# Patient Record
Sex: FEMALE | Race: BLACK OR AFRICAN AMERICAN | NOT HISPANIC OR LATINO | URBAN - METROPOLITAN AREA
[De-identification: names, ages, dates, MRNs, and addresses within clinical notes are randomized per-mention and may not be internally consistent; named-entity substitution may affect disease eponyms.]

---

## 2020-01-28 ENCOUNTER — EMERGENCY (EMERGENCY)
Facility: HOSPITAL | Age: 60
LOS: 1 days | Discharge: HOME | End: 2020-01-28
Attending: EMERGENCY MEDICINE | Admitting: EMERGENCY MEDICINE
Payer: COMMERCIAL

## 2020-01-28 VITALS
DIASTOLIC BLOOD PRESSURE: 86 MMHG | TEMPERATURE: 98 F | HEART RATE: 80 BPM | SYSTOLIC BLOOD PRESSURE: 122 MMHG | RESPIRATION RATE: 19 BRPM | OXYGEN SATURATION: 97 %

## 2020-01-28 VITALS
HEIGHT: 62 IN | SYSTOLIC BLOOD PRESSURE: 124 MMHG | RESPIRATION RATE: 17 BRPM | HEART RATE: 97 BPM | DIASTOLIC BLOOD PRESSURE: 82 MMHG | OXYGEN SATURATION: 98 % | WEIGHT: 218.04 LBS | TEMPERATURE: 98 F

## 2020-01-28 LAB
ALBUMIN SERPL ELPH-MCNC: 4 G/DL — SIGNIFICANT CHANGE UP (ref 3.3–5)
ALP SERPL-CCNC: 99 U/L — SIGNIFICANT CHANGE UP (ref 40–120)
ALT FLD-CCNC: 16 U/L — SIGNIFICANT CHANGE UP (ref 10–45)
ANION GAP SERPL CALC-SCNC: 15 MMOL/L — SIGNIFICANT CHANGE UP (ref 5–17)
APTT BLD: 31.6 SEC — SIGNIFICANT CHANGE UP (ref 27.5–36.3)
AST SERPL-CCNC: 16 U/L — SIGNIFICANT CHANGE UP (ref 10–40)
BILIRUB SERPL-MCNC: 0.2 MG/DL — SIGNIFICANT CHANGE UP (ref 0.2–1.2)
BUN SERPL-MCNC: 11 MG/DL — SIGNIFICANT CHANGE UP (ref 7–23)
CALCIUM SERPL-MCNC: 8.7 MG/DL — SIGNIFICANT CHANGE UP (ref 8.4–10.5)
CHLORIDE SERPL-SCNC: 103 MMOL/L — SIGNIFICANT CHANGE UP (ref 96–108)
CO2 SERPL-SCNC: 25 MMOL/L — SIGNIFICANT CHANGE UP (ref 22–31)
CREAT SERPL-MCNC: 0.67 MG/DL — SIGNIFICANT CHANGE UP (ref 0.5–1.3)
GLUCOSE SERPL-MCNC: 336 MG/DL — HIGH (ref 70–99)
HCT VFR BLD CALC: 38.4 % — SIGNIFICANT CHANGE UP (ref 34.5–45)
HGB BLD-MCNC: 12.8 G/DL — SIGNIFICANT CHANGE UP (ref 11.5–15.5)
INR BLD: 0.97 — SIGNIFICANT CHANGE UP (ref 0.88–1.16)
MCHC RBC-ENTMCNC: 28.7 PG — SIGNIFICANT CHANGE UP (ref 27–34)
MCHC RBC-ENTMCNC: 33.3 GM/DL — SIGNIFICANT CHANGE UP (ref 32–36)
MCV RBC AUTO: 86.1 FL — SIGNIFICANT CHANGE UP (ref 80–100)
NRBC # BLD: 0 /100 WBCS — SIGNIFICANT CHANGE UP (ref 0–0)
PLATELET # BLD AUTO: 210 K/UL — SIGNIFICANT CHANGE UP (ref 150–400)
POTASSIUM SERPL-MCNC: 4.1 MMOL/L — SIGNIFICANT CHANGE UP (ref 3.5–5.3)
POTASSIUM SERPL-SCNC: 4.1 MMOL/L — SIGNIFICANT CHANGE UP (ref 3.5–5.3)
PROT SERPL-MCNC: 6.8 G/DL — SIGNIFICANT CHANGE UP (ref 6–8.3)
PROTHROM AB SERPL-ACNC: 11.1 SEC — SIGNIFICANT CHANGE UP (ref 10–12.9)
RBC # BLD: 4.46 M/UL — SIGNIFICANT CHANGE UP (ref 3.8–5.2)
RBC # FLD: 13.3 % — SIGNIFICANT CHANGE UP (ref 10.3–14.5)
SODIUM SERPL-SCNC: 143 MMOL/L — SIGNIFICANT CHANGE UP (ref 135–145)
TROPONIN T SERPL-MCNC: <0.01 NG/ML — SIGNIFICANT CHANGE UP (ref 0–0.01)
TROPONIN T SERPL-MCNC: <0.01 NG/ML — SIGNIFICANT CHANGE UP (ref 0–0.01)
WBC # BLD: 10.67 K/UL — HIGH (ref 3.8–10.5)
WBC # FLD AUTO: 10.67 K/UL — HIGH (ref 3.8–10.5)

## 2020-01-28 PROCEDURE — 36415 COLL VENOUS BLD VENIPUNCTURE: CPT

## 2020-01-28 PROCEDURE — 99285 EMERGENCY DEPT VISIT HI MDM: CPT | Mod: 25

## 2020-01-28 PROCEDURE — 71045 X-RAY EXAM CHEST 1 VIEW: CPT

## 2020-01-28 PROCEDURE — 85610 PROTHROMBIN TIME: CPT

## 2020-01-28 PROCEDURE — 99284 EMERGENCY DEPT VISIT MOD MDM: CPT | Mod: 25

## 2020-01-28 PROCEDURE — 84484 ASSAY OF TROPONIN QUANT: CPT

## 2020-01-28 PROCEDURE — 71045 X-RAY EXAM CHEST 1 VIEW: CPT | Mod: 26

## 2020-01-28 PROCEDURE — 85379 FIBRIN DEGRADATION QUANT: CPT

## 2020-01-28 PROCEDURE — 85730 THROMBOPLASTIN TIME PARTIAL: CPT

## 2020-01-28 PROCEDURE — 80053 COMPREHEN METABOLIC PANEL: CPT

## 2020-01-28 PROCEDURE — 85027 COMPLETE CBC AUTOMATED: CPT

## 2020-01-28 PROCEDURE — 93005 ELECTROCARDIOGRAM TRACING: CPT

## 2020-01-28 PROCEDURE — 93010 ELECTROCARDIOGRAM REPORT: CPT

## 2020-01-28 NOTE — ED PROVIDER NOTE - NSFOLLOWUPINSTRUCTIONS_ED_ALL_ED_FT
Nonspecific Chest Pain, Adult  Chest pain can be caused by many different conditions. It can be caused by a condition that is life-threatening and requires treatment right away. It can also be caused by something that is not life-threatening. If you have chest pain, it can be hard to know the difference, so it is important to get help right away to make sure that you do not have a serious condition.  Some life-threatening causes of chest pain include:  Heart attack.A tear in the body's main blood vessel (aortic dissection).Inflammation around your heart (pericarditis).A problem in the lungs, such as a blood clot (pulmonary embolism) or a collapsed lung (pneumothorax).Some non life-threatening causes of chest pain include:  Heartburn.Anxiety or stress.Damage to the bones, muscles, and cartilage that make up your chest wall.Pneumonia or bronchitis.Shingles infection (varicella-zoster virus).Chest pain can feel like:  Pain or discomfort on the surface of your chest or deep in your chest.Crushing, pressure, aching, or squeezing pain.Burning or tingling.Dull or sharp pain that is worse when you move, cough, or take a deep breath.Pain or discomfort that is also felt in your back, neck, jaw, shoulder, or arm, or pain that spreads to any of these areas.Your chest pain may come and go. It may also be constant. Your health care provider will do lab tests and other studies to find the cause of your pain. Treatment will depend on the cause of your chest pain.  Follow these instructions at home:  Medicines     Take over-the-counter and prescription medicines only as told by your health care provider.If you were prescribed an antibiotic, take it as told by your health care provider. Do not stop taking the antibiotic even if you start to feel better.Lifestyle        Rest as directed by your health care provider.Do not use any products that contain nicotine or tobacco, such as cigarettes and e-cigarettes. If you need help quitting, ask your health care provider.Do not drink alcohol.Make healthy lifestyle choices as recommended. These may include:  Getting regular exercise. Ask your health care provider to suggest some activities that are safe for you.Eating a heart-healthy diet. This includes plenty of fresh fruits and vegetables, whole grains, low-fat (lean) protein, and low-fat dairy products. A dietitian can help you find healthy eating options.Maintaining a healthy weight.Managing any other health conditions you have, such as high blood pressure (hypertension) or diabetes.Reducing stress, such as with yoga or relaxation techniques.General instructions     Pay attention to any changes in your symptoms. Tell your health care provider about them or any new symptoms.Avoid any activities that cause chest pain.Keep all follow-up visits as told by your health care provider. This is important. This includes visits for any further testing if your chest pain does not go away.Contact a health care provider if:  Your chest pain does not go away.You feel depressed.You have a fever.Get help right away if:  Your chest pain gets worse.You have a cough that gets worse, or you cough up blood.You have severe pain in your abdomen.You faint.You have sudden, unexplained chest discomfort.You have sudden, unexplained discomfort in your arms, back, neck, or jaw.You have shortness of breath at any time.You suddenly start to sweat, or your skin gets clammy.You feel nausea or you vomit.You suddenly feel lightheaded or dizzy.You have severe weakness, or unexplained weakness or fatigue.Your heart begins to beat quickly, or it feels like it is skipping beats.These symptoms may represent a serious problem that is an emergency. Do not wait to see if the symptoms will go away. Get medical help right away. Call your local emergency services (911 in the [place] Do not drive yourself to the hospital.   Summary  Chest pain can be caused by a condition that is serious and requires urgent treatment. It may also be caused by something that is not life-threatening.If you have chest pain, it is very important to see your health care provider. Your health care provider may do lab tests and other studies to find the cause of your pain.Follow your health care provider's instructions on taking medicines, making lifestyle changes, and getting emergency treatment if symptoms become worse.Keep all follow-up visits as told by your health care provider. This includes visits for any further testing if your chest pain does not go away.This information is not intended to replace advice given to you by your health care provider. Make sure you discuss any questions you have with your health care provider.

## 2020-01-28 NOTE — ED PROVIDER NOTE - DIAGNOSTIC INTERPRETATION
ER Physician: Jade Armstrong MD  CHEST XRAY INTERPRETATION: lungs clear, heart shadow normal, bony structures intact

## 2020-01-28 NOTE — ED PROVIDER NOTE - CLINICAL SUMMARY MEDICAL DECISION MAKING FREE TEXT BOX
60 y/o F PMHx DM, HTN, GERD, neuropathy and lower back pain p/w CP that is now resolved. Comfortable appearing. Will obtain cardiac evaluation and reassess. Low risk [place]. Pt w/ no acute ischemia on EKG.

## 2020-01-28 NOTE — ED ADULT NURSE REASSESSMENT NOTE - NS ED NURSE REASSESS COMMENT FT1
Pt received in no acute distress, a&ox3, breathing with ease on room air. Pt denies any active chest pain, sob, palpitations and dizziness, asymptomatic at this time. Pt with 20g heplock to LAC, intact, no edema or redness noted. Pt pending 2nd trop and md dispo. Pt otherwise comfortable and assisted with all needs. Denies any other c/o. Will monitor.

## 2020-01-28 NOTE — ED PROVIDER NOTE - CARE PROVIDERS DIRECT ADDRESSES
[email or web address]

## 2020-01-28 NOTE — ED ADULT NURSE NOTE - DISCHARGE DATE/TIME
[facility] Injection instructions  You had trigger point injections/bursa injection left hip  Meds used:  Lidocaine/bupivicaine/dexamethasone left hip       Lidocaine/bupivicaine/buttocks and low back   Nurse line:  [phone]  Appointment line:  [phone]      Go to the emergency room if you develop any shortness of breath    Monitor the injection sites for signs and symptoms of infection-fever, chills, redness, swelling, warmth, or drainage to areas.    Okay to use ice to the areas.    Do not apply heat to sites for at least 12 hours.    You may have soreness at injection sites for up to 24 hours.    If you are able to use anti-inflammatory medications or Tylenol for pain control if necessary, you can take these as directed.  After hours doctor line:  [phone]  Return visit 3- 4 weeks for TPI's   [facility] or [facility]    
28-Jan-2020 22:17

## 2020-01-28 NOTE — ED PROVIDER NOTE - PMH
DM (diabetes mellitus)    GERD (gastroesophageal reflux disease)    HTN (hypertension)    Lower back pain    Neuropathy

## 2020-01-28 NOTE — ED ADULT NURSE NOTE - OBJECTIVE STATEMENT
Pt is a 59y female referred to ED by PCP for abnormal ekg. As per pt, went to see PCP, and was told had "abnormal ekg, blockage of the heart." Pt c/o intermittent CP/SOB, denies any symptoms upon assessment. current smoker.

## 2020-01-28 NOTE — ED PROVIDER NOTE - CARE PROVIDER_API CALL
Austin Mcginnis)  Cardiovascular Disease  [address] [facility] [address] [facility] [place] [address]  Phone: [phone]  Fax: [phone]  Follow Up Time:     Ady Pool)  Cardiovascular Disease; Internal Medicine  [address] [place] [address]  Phone: [phone]  Fax: [phone]  Follow Up Time:

## 2020-01-28 NOTE — ED PROVIDER NOTE - OBJECTIVE STATEMENT
58 y/o F PMHx DM, HTN, GERD, neuropathy and lower back pain presents to the ED with c/o left upper CP since yesterday. Pt stated that it felt like a "gas" sensation. Pt denies radiation to neck/arms/back/abdomen, worsening pain on exertion, leg swelling, abdominal pain, personal or FHx of heart problems. She is a current smoker and comes to the ED w/ concern regarding abnormal EKG. 60 y/o F PMHx DM, HTN, GERD, neuropathy and lower back pain presents to the ED with c/o left upper CP since yesterday. Pt stated that it felt like a "gas" sensation. Pt denies radiation to neck/arms/back/abdomen, worsening pain on exertion, leg swelling, abdominal pain, personal or FHx of early cardiac disease. She is a current smoker and comes to the ED w/ concern regarding abnormal EKG.

## 2020-01-28 NOTE — ED PROVIDER NOTE - PATIENT PORTAL LINK FT
You can access the FollowMyHealth Patient Portal offered by [facility] by registering at the following website: [email or web address]. By joining [facility]’s FollowMyHealth portal, you will also be able to view your health information using other applications (apps) compatible with our system.

## 2020-01-28 NOTE — ED PROVIDER NOTE - CHPI ED SYMPTOMS NEG
denies radiation to neck/arms/back/abdomen, worsening pain on exertion, leg swelling, abdominal pain, personal or FHx of heart problems

## 2020-02-04 DIAGNOSIS — R07.89 OTHER CHEST PAIN: ICD-10-CM

## 2020-02-04 DIAGNOSIS — R94.31 ABNORMAL ELECTROCARDIOGRAM [ECG] [EKG]: ICD-10-CM

## 2020-02-04 DIAGNOSIS — R06.02 SHORTNESS OF BREATH: ICD-10-CM

## 2020-03-04 PROBLEM — Z00.00 ENCOUNTER FOR PREVENTIVE HEALTH EXAMINATION: Status: ACTIVE | Noted: 2020-03-04

## 2020-06-16 NOTE — ED ADULT TRIAGE NOTE - INTERNATIONAL TRAVEL
No
Implemented All Universal Safety Interventions:  [place] to call system. Call bell, personal items and telephone within reach. Instruct patient to call for assistance. Room bathroom lighting operational. Non-slip footwear when patient is off stretcher. Physically safe environment: no spills, clutter or unnecessary equipment. Stretcher in lowest position, wheels locked, appropriate side rails in place.

## 2021-06-01 NOTE — ED ADULT TRIAGE NOTE - BMI (KG/M2)
Received call from patient requesting refill(s) of      traMADol (ULTRAM) 50 MG tablet   Last dispensed from pharmacy on 04/29/21    traMADol (ULTRAM-ER) 200 MG 24 hr tablet  Last dispensed from pharmacy on 05/05/21    Patient's last office/virtual visit by prescribing provider on 01/29/21  Next office/virtual appointment scheduled for None    Last urine drug screen date 04/29/21  Current opioid agreement on file (completed within the last year) Yes Date of opioid agreement: 04/29/21    E-prescribe to   [address] [address]  Phone: [phone] Fax: [phone]    Will route to nursing [place] for review and preparation of prescription(s).       Lashonda Lyles MA  [facility]       39.9

## 2024-07-16 VITALS
OXYGEN SATURATION: 98 % | HEIGHT: 62 IN | DIASTOLIC BLOOD PRESSURE: 87 MMHG | TEMPERATURE: 98 F | SYSTOLIC BLOOD PRESSURE: 133 MMHG | WEIGHT: 210.1 LBS | RESPIRATION RATE: 20 BRPM | HEART RATE: 93 BPM

## 2024-07-16 LAB
ADD ON TEST-SPECIMEN IN LAB: SIGNIFICANT CHANGE UP
ANION GAP SERPL CALC-SCNC: 7 MMOL/L — SIGNIFICANT CHANGE UP (ref 5–17)
ANISOCYTOSIS BLD QL: SLIGHT — SIGNIFICANT CHANGE UP
BASE EXCESS BLDV CALC-SCNC: 8.4 MMOL/L — HIGH (ref -2–3)
BASOPHILS # BLD AUTO: 0 K/UL — SIGNIFICANT CHANGE UP (ref 0–0.2)
BASOPHILS NFR BLD AUTO: 0 % — SIGNIFICANT CHANGE UP (ref 0–2)
BUN SERPL-MCNC: 8 MG/DL — SIGNIFICANT CHANGE UP (ref 7–23)
BURR CELLS BLD QL SMEAR: PRESENT — SIGNIFICANT CHANGE UP
CA-I SERPL-SCNC: 1.17 MMOL/L — SIGNIFICANT CHANGE UP (ref 1.15–1.33)
CALCIUM SERPL-MCNC: 9.1 MG/DL — SIGNIFICANT CHANGE UP (ref 8.4–10.5)
CHLORIDE SERPL-SCNC: 102 MMOL/L — SIGNIFICANT CHANGE UP (ref 96–108)
CO2 BLDV-SCNC: 38.2 MMOL/L — HIGH (ref 22–26)
CO2 SERPL-SCNC: 33 MMOL/L — HIGH (ref 22–31)
CREAT SERPL-MCNC: 0.74 MG/DL — SIGNIFICANT CHANGE UP (ref 0.5–1.3)
DACRYOCYTES BLD QL SMEAR: SLIGHT — SIGNIFICANT CHANGE UP
EGFR: 90 ML/MIN/1.73M2 — SIGNIFICANT CHANGE UP
EOSINOPHIL # BLD AUTO: 0.33 K/UL — SIGNIFICANT CHANGE UP (ref 0–0.5)
EOSINOPHIL NFR BLD AUTO: 2.6 % — SIGNIFICANT CHANGE UP (ref 0–6)
GAS PNL BLDV: 139 MMOL/L — SIGNIFICANT CHANGE UP (ref 136–145)
GAS PNL BLDV: SIGNIFICANT CHANGE UP
GAS PNL BLDV: SIGNIFICANT CHANGE UP
GLUCOSE SERPL-MCNC: 76 MG/DL — SIGNIFICANT CHANGE UP (ref 70–99)
HCO3 BLDV-SCNC: 36 MMOL/L — HIGH (ref 22–29)
HCT VFR BLD CALC: 42.6 % — SIGNIFICANT CHANGE UP (ref 34.5–45)
HGB BLD-MCNC: 14 G/DL — SIGNIFICANT CHANGE UP (ref 11.5–15.5)
LYMPHOCYTES # BLD AUTO: 40 % — SIGNIFICANT CHANGE UP (ref 13–44)
LYMPHOCYTES # BLD AUTO: 5 K/UL — HIGH (ref 1–3.3)
MACROCYTES BLD QL: SLIGHT — SIGNIFICANT CHANGE UP
MANUAL SMEAR VERIFICATION: SIGNIFICANT CHANGE UP
MCHC RBC-ENTMCNC: 28.9 PG — SIGNIFICANT CHANGE UP (ref 27–34)
MCHC RBC-ENTMCNC: 32.9 GM/DL — SIGNIFICANT CHANGE UP (ref 32–36)
MCV RBC AUTO: 88 FL — SIGNIFICANT CHANGE UP (ref 80–100)
MICROCYTES BLD QL: SLIGHT — SIGNIFICANT CHANGE UP
MONOCYTES # BLD AUTO: 0.55 K/UL — SIGNIFICANT CHANGE UP (ref 0–0.9)
MONOCYTES NFR BLD AUTO: 4.4 % — SIGNIFICANT CHANGE UP (ref 2–14)
NEUTROPHILS # BLD AUTO: 6.63 K/UL — SIGNIFICANT CHANGE UP (ref 1.8–7.4)
NEUTROPHILS NFR BLD AUTO: 53 % — SIGNIFICANT CHANGE UP (ref 43–77)
NT-PROBNP SERPL-SCNC: <36 PG/ML — SIGNIFICANT CHANGE UP (ref 0–300)
OVALOCYTES BLD QL SMEAR: SLIGHT — SIGNIFICANT CHANGE UP
PCO2 BLDV: 64 MMHG — HIGH (ref 39–42)
PH BLDV: 7.36 — SIGNIFICANT CHANGE UP (ref 7.32–7.43)
PLAT MORPH BLD: ABNORMAL
PLATELET # BLD AUTO: 254 K/UL — SIGNIFICANT CHANGE UP (ref 150–400)
PO2 BLDV: 46 MMHG — HIGH (ref 25–45)
POIKILOCYTOSIS BLD QL AUTO: SLIGHT — SIGNIFICANT CHANGE UP
POLYCHROMASIA BLD QL SMEAR: SLIGHT — SIGNIFICANT CHANGE UP
POTASSIUM BLDV-SCNC: 3.1 MMOL/L — LOW (ref 3.5–5.1)
POTASSIUM SERPL-MCNC: 3.2 MMOL/L — LOW (ref 3.5–5.3)
POTASSIUM SERPL-SCNC: 3.2 MMOL/L — LOW (ref 3.5–5.3)
RBC # BLD: 4.84 M/UL — SIGNIFICANT CHANGE UP (ref 3.8–5.2)
RBC # FLD: 14.6 % — HIGH (ref 10.3–14.5)
RBC BLD AUTO: ABNORMAL
SAO2 % BLDV: 76.6 % — SIGNIFICANT CHANGE UP (ref 67–88)
SODIUM SERPL-SCNC: 142 MMOL/L — SIGNIFICANT CHANGE UP (ref 135–145)
WBC # BLD: 12.51 K/UL — HIGH (ref 3.8–10.5)
WBC # FLD AUTO: 12.51 K/UL — HIGH (ref 3.8–10.5)

## 2024-07-16 PROCEDURE — 71045 X-RAY EXAM CHEST 1 VIEW: CPT | Mod: 26

## 2024-07-16 PROCEDURE — 99285 EMERGENCY DEPT VISIT HI MDM: CPT

## 2024-07-16 RX ORDER — IPRATROPIUM BROMIDE AND ALBUTEROL SULFATE .5; 3 MG/3ML; MG/3ML
3 SOLUTION RESPIRATORY (INHALATION) ONCE
Refills: 0 | Status: COMPLETED | OUTPATIENT
Start: 2024-07-16 | End: 2024-07-16

## 2024-07-16 RX ORDER — BUDESONIDE 0.25 MG/2ML
0.5 INHALANT ORAL ONCE
Refills: 0 | Status: COMPLETED | OUTPATIENT
Start: 2024-07-16 | End: 2024-07-17

## 2024-07-16 RX ADMIN — IPRATROPIUM BROMIDE AND ALBUTEROL SULFATE 3 MILLILITER(S): .5; 3 SOLUTION RESPIRATORY (INHALATION) at 23:15

## 2024-07-16 NOTE — ED ADULT TRIAGE NOTE - CHIEF COMPLAINT QUOTE
c/o shortness of breath after smoking 10 cigarettes. as per EMS, pt was saturating 78% on cpap. currently on NRB

## 2024-07-16 NOTE — ED ADULT NURSE NOTE - CAS EDN DISCHARGE ASSESSMENT
Alert and oriented to person, place and time Complex Repair And Flap Additional Text (Will Appearing After The Standard Complex Repair Text): The complex repair was not sufficient to completely close the primary defect. The remaining additional defect was repaired with the flap mentioned below.

## 2024-07-16 NOTE — ED ADULT NURSE NOTE - OBJECTIVE STATEMENT
63y female PMH COPD on cpap at night to the ER from home via EMS c/o of SOB. Pt reports shortness of breath after smoking 10 cigarettes. EMS reported that pt was saturation of 78% on cpap was switched to NRB and was saturation in high 90s. Upon arrival to ER pt switched to 4L NC- saturation in high 90s. Pt reports shortness of breath happened after smoking and eat a sandwich. No chest pain, no fevers, no sick contacts.

## 2024-07-17 ENCOUNTER — TRANSCRIPTION ENCOUNTER (OUTPATIENT)
Age: 64
End: 2024-07-17

## 2024-07-17 ENCOUNTER — RESULT REVIEW (OUTPATIENT)
Age: 64
End: 2024-07-17

## 2024-07-17 ENCOUNTER — INPATIENT (INPATIENT)
Facility: HOSPITAL | Age: 64
LOS: 0 days | Discharge: HOME | DRG: 190 | End: 2024-07-17
Attending: STUDENT IN AN ORGANIZED HEALTH CARE EDUCATION/TRAINING PROGRAM | Admitting: STUDENT IN AN ORGANIZED HEALTH CARE EDUCATION/TRAINING PROGRAM
Payer: MEDICAID

## 2024-07-17 VITALS — OXYGEN SATURATION: 94 % | RESPIRATION RATE: 18 BRPM

## 2024-07-17 DIAGNOSIS — I10 ESSENTIAL (PRIMARY) HYPERTENSION: ICD-10-CM

## 2024-07-17 DIAGNOSIS — J44.1 CHRONIC OBSTRUCTIVE PULMONARY DISEASE WITH (ACUTE) EXACERBATION: ICD-10-CM

## 2024-07-17 DIAGNOSIS — M54.16 RADICULOPATHY, LUMBAR REGION: ICD-10-CM

## 2024-07-17 DIAGNOSIS — Z29.9 ENCOUNTER FOR PROPHYLACTIC MEASURES, UNSPECIFIED: ICD-10-CM

## 2024-07-17 DIAGNOSIS — R01.1 CARDIAC MURMUR, UNSPECIFIED: ICD-10-CM

## 2024-07-17 DIAGNOSIS — F41.9 ANXIETY DISORDER, UNSPECIFIED: ICD-10-CM

## 2024-07-17 DIAGNOSIS — E11.9 TYPE 2 DIABETES MELLITUS WITHOUT COMPLICATIONS: ICD-10-CM

## 2024-07-17 PROBLEM — K21.9 GASTRO-ESOPHAGEAL REFLUX DISEASE WITHOUT ESOPHAGITIS: Chronic | Status: ACTIVE | Noted: 2020-01-28

## 2024-07-17 PROBLEM — G62.9 POLYNEUROPATHY, UNSPECIFIED: Chronic | Status: ACTIVE | Noted: 2020-01-28

## 2024-07-17 PROBLEM — M54.5 LOW BACK PAIN: Chronic | Status: ACTIVE | Noted: 2020-01-28

## 2024-07-17 LAB
A1C WITH ESTIMATED AVERAGE GLUCOSE RESULT: 6.4 % — HIGH (ref 4–5.6)
ALBUMIN SERPL ELPH-MCNC: 4 G/DL — SIGNIFICANT CHANGE UP (ref 3.3–5)
ALP SERPL-CCNC: 86 U/L — SIGNIFICANT CHANGE UP (ref 40–120)
ALT FLD-CCNC: 11 U/L — SIGNIFICANT CHANGE UP (ref 10–45)
ANION GAP SERPL CALC-SCNC: 12 MMOL/L — SIGNIFICANT CHANGE UP (ref 5–17)
AST SERPL-CCNC: 16 U/L — SIGNIFICANT CHANGE UP (ref 10–40)
BASOPHILS # BLD AUTO: 0.03 K/UL — SIGNIFICANT CHANGE UP (ref 0–0.2)
BASOPHILS NFR BLD AUTO: 0.3 % — SIGNIFICANT CHANGE UP (ref 0–2)
BILIRUB SERPL-MCNC: 0.2 MG/DL — SIGNIFICANT CHANGE UP (ref 0.2–1.2)
BUN SERPL-MCNC: 11 MG/DL — SIGNIFICANT CHANGE UP (ref 7–23)
CALCIUM SERPL-MCNC: 9.4 MG/DL — SIGNIFICANT CHANGE UP (ref 8.4–10.5)
CHLORIDE SERPL-SCNC: 103 MMOL/L — SIGNIFICANT CHANGE UP (ref 96–108)
CHOLEST SERPL-MCNC: 182 MG/DL — SIGNIFICANT CHANGE UP
CO2 SERPL-SCNC: 28 MMOL/L — SIGNIFICANT CHANGE UP (ref 22–31)
CREAT SERPL-MCNC: 0.66 MG/DL — SIGNIFICANT CHANGE UP (ref 0.5–1.3)
EGFR: 98 ML/MIN/1.73M2 — SIGNIFICANT CHANGE UP
EOSINOPHIL # BLD AUTO: 0.02 K/UL — SIGNIFICANT CHANGE UP (ref 0–0.5)
EOSINOPHIL NFR BLD AUTO: 0.2 % — SIGNIFICANT CHANGE UP (ref 0–6)
ESTIMATED AVERAGE GLUCOSE: 137 MG/DL — HIGH (ref 68–114)
GLUCOSE BLDC GLUCOMTR-MCNC: 156 MG/DL — HIGH (ref 70–99)
GLUCOSE BLDC GLUCOMTR-MCNC: 167 MG/DL — HIGH (ref 70–99)
GLUCOSE BLDC GLUCOMTR-MCNC: 172 MG/DL — HIGH (ref 70–99)
GLUCOSE SERPL-MCNC: 184 MG/DL — HIGH (ref 70–99)
HCT VFR BLD CALC: 44.3 % — SIGNIFICANT CHANGE UP (ref 34.5–45)
HDLC SERPL-MCNC: 75 MG/DL — SIGNIFICANT CHANGE UP
HGB BLD-MCNC: 14.5 G/DL — SIGNIFICANT CHANGE UP (ref 11.5–15.5)
IMM GRANULOCYTES NFR BLD AUTO: 0.9 % — SIGNIFICANT CHANGE UP (ref 0–0.9)
LIPID PNL WITH DIRECT LDL SERPL: 96 MG/DL — SIGNIFICANT CHANGE UP
LYMPHOCYTES # BLD AUTO: 1.28 K/UL — SIGNIFICANT CHANGE UP (ref 1–3.3)
LYMPHOCYTES # BLD AUTO: 11.5 % — LOW (ref 13–44)
MAGNESIUM SERPL-MCNC: 1.9 MG/DL — SIGNIFICANT CHANGE UP (ref 1.6–2.6)
MCHC RBC-ENTMCNC: 28.7 PG — SIGNIFICANT CHANGE UP (ref 27–34)
MCHC RBC-ENTMCNC: 32.7 GM/DL — SIGNIFICANT CHANGE UP (ref 32–36)
MCV RBC AUTO: 87.7 FL — SIGNIFICANT CHANGE UP (ref 80–100)
MONOCYTES # BLD AUTO: 0.13 K/UL — SIGNIFICANT CHANGE UP (ref 0–0.9)
MONOCYTES NFR BLD AUTO: 1.2 % — LOW (ref 2–14)
NEUTROPHILS # BLD AUTO: 9.54 K/UL — HIGH (ref 1.8–7.4)
NEUTROPHILS NFR BLD AUTO: 85.9 % — HIGH (ref 43–77)
NON HDL CHOLESTEROL: 107 MG/DL — SIGNIFICANT CHANGE UP
NRBC # BLD: 0 /100 WBCS — SIGNIFICANT CHANGE UP (ref 0–0)
PHOSPHATE SERPL-MCNC: 2.7 MG/DL — SIGNIFICANT CHANGE UP (ref 2.5–4.5)
PLATELET # BLD AUTO: 239 K/UL — SIGNIFICANT CHANGE UP (ref 150–400)
POTASSIUM SERPL-MCNC: 3.9 MMOL/L — SIGNIFICANT CHANGE UP (ref 3.5–5.3)
POTASSIUM SERPL-SCNC: 3.9 MMOL/L — SIGNIFICANT CHANGE UP (ref 3.5–5.3)
PROT SERPL-MCNC: 7.5 G/DL — SIGNIFICANT CHANGE UP (ref 6–8.3)
RAPID RVP RESULT: SIGNIFICANT CHANGE UP
RBC # BLD: 5.05 M/UL — SIGNIFICANT CHANGE UP (ref 3.8–5.2)
RBC # FLD: 14.6 % — HIGH (ref 10.3–14.5)
SARS-COV-2 RNA SPEC QL NAA+PROBE: SIGNIFICANT CHANGE UP
SODIUM SERPL-SCNC: 143 MMOL/L — SIGNIFICANT CHANGE UP (ref 135–145)
TRIGL SERPL-MCNC: 57 MG/DL — SIGNIFICANT CHANGE UP
WBC # BLD: 11.1 K/UL — HIGH (ref 3.8–10.5)
WBC # FLD AUTO: 11.1 K/UL — HIGH (ref 3.8–10.5)

## 2024-07-17 PROCEDURE — 84132 ASSAY OF SERUM POTASSIUM: CPT

## 2024-07-17 PROCEDURE — 84100 ASSAY OF PHOSPHORUS: CPT

## 2024-07-17 PROCEDURE — 36415 COLL VENOUS BLD VENIPUNCTURE: CPT

## 2024-07-17 PROCEDURE — 93010 ELECTROCARDIOGRAM REPORT: CPT | Mod: 76

## 2024-07-17 PROCEDURE — 83735 ASSAY OF MAGNESIUM: CPT

## 2024-07-17 PROCEDURE — 83880 ASSAY OF NATRIURETIC PEPTIDE: CPT

## 2024-07-17 PROCEDURE — 99236 HOSP IP/OBS SAME DATE HI 85: CPT | Mod: GC

## 2024-07-17 PROCEDURE — 84295 ASSAY OF SERUM SODIUM: CPT

## 2024-07-17 PROCEDURE — 82962 GLUCOSE BLOOD TEST: CPT

## 2024-07-17 PROCEDURE — 80048 BASIC METABOLIC PNL TOTAL CA: CPT

## 2024-07-17 PROCEDURE — 99285 EMERGENCY DEPT VISIT HI MDM: CPT

## 2024-07-17 PROCEDURE — 80061 LIPID PANEL: CPT

## 2024-07-17 PROCEDURE — 93306 TTE W/DOPPLER COMPLETE: CPT | Mod: 26

## 2024-07-17 PROCEDURE — 85025 COMPLETE CBC W/AUTO DIFF WBC: CPT

## 2024-07-17 PROCEDURE — 80053 COMPREHEN METABOLIC PANEL: CPT

## 2024-07-17 PROCEDURE — 82330 ASSAY OF CALCIUM: CPT

## 2024-07-17 PROCEDURE — 0225U NFCT DS DNA&RNA 21 SARSCOV2: CPT

## 2024-07-17 PROCEDURE — 84484 ASSAY OF TROPONIN QUANT: CPT

## 2024-07-17 PROCEDURE — 82803 BLOOD GASES ANY COMBINATION: CPT

## 2024-07-17 PROCEDURE — 94640 AIRWAY INHALATION TREATMENT: CPT

## 2024-07-17 PROCEDURE — 93005 ELECTROCARDIOGRAM TRACING: CPT

## 2024-07-17 PROCEDURE — 83036 HEMOGLOBIN GLYCOSYLATED A1C: CPT

## 2024-07-17 PROCEDURE — 71045 X-RAY EXAM CHEST 1 VIEW: CPT

## 2024-07-17 PROCEDURE — C8929: CPT

## 2024-07-17 RX ORDER — DAPAGLIFLOZIN 10 MG/1
1 TABLET, FILM COATED ORAL
Refills: 0 | DISCHARGE

## 2024-07-17 RX ORDER — GABAPENTIN
800 POWDER (GRAM) MISCELLANEOUS AT BEDTIME
Refills: 0 | Status: DISCONTINUED | OUTPATIENT
Start: 2024-07-17 | End: 2024-07-17

## 2024-07-17 RX ORDER — GABAPENTIN
1 POWDER (GRAM) MISCELLANEOUS
Refills: 0 | DISCHARGE

## 2024-07-17 RX ORDER — AMLODIPINE BESYLATE 2.5 MG/1
10 TABLET ORAL EVERY 24 HOURS
Refills: 0 | Status: DISCONTINUED | OUTPATIENT
Start: 2024-07-17 | End: 2024-07-17

## 2024-07-17 RX ORDER — DULOXETINE HYDROCHLORIDE 20 MG/1
1 CAPSULE, DELAYED RELEASE ORAL
Refills: 0 | DISCHARGE

## 2024-07-17 RX ORDER — IPRATROPIUM BROMIDE AND ALBUTEROL SULFATE .5; 3 MG/3ML; MG/3ML
3 SOLUTION RESPIRATORY (INHALATION) EVERY 6 HOURS
Refills: 0 | Status: DISCONTINUED | OUTPATIENT
Start: 2024-07-17 | End: 2024-07-17

## 2024-07-17 RX ORDER — DEXTROSE MONOHYDRATE AND SODIUM CHLORIDE 5; .3 G/100ML; G/100ML
1000 INJECTION, SOLUTION INTRAVENOUS
Refills: 0 | Status: DISCONTINUED | OUTPATIENT
Start: 2024-07-17 | End: 2024-07-17

## 2024-07-17 RX ORDER — DEXTROSE 30 % IN WATER 30 %
25 VIAL (ML) INTRAVENOUS ONCE
Refills: 0 | Status: DISCONTINUED | OUTPATIENT
Start: 2024-07-17 | End: 2024-07-17

## 2024-07-17 RX ORDER — AZITHROMYCIN 250 MG/1
500 TABLET, FILM COATED ORAL ONCE
Refills: 0 | Status: COMPLETED | OUTPATIENT
Start: 2024-07-17 | End: 2024-07-17

## 2024-07-17 RX ORDER — INSULIN GLARGINE 100 [IU]/ML
44 INJECTION, SOLUTION SUBCUTANEOUS ONCE
Refills: 0 | Status: COMPLETED | OUTPATIENT
Start: 2024-07-17 | End: 2024-07-17

## 2024-07-17 RX ORDER — BUSPIRONE HYDROCHLORIDE 10 MG/1
1 TABLET ORAL
Refills: 0 | DISCHARGE

## 2024-07-17 RX ORDER — DEXTROSE 30 % IN WATER 30 %
12.5 VIAL (ML) INTRAVENOUS ONCE
Refills: 0 | Status: DISCONTINUED | OUTPATIENT
Start: 2024-07-17 | End: 2024-07-17

## 2024-07-17 RX ORDER — POTASSIUM CHLORIDE 600 MG/1
40 TABLET, FILM COATED, EXTENDED RELEASE ORAL ONCE
Refills: 0 | Status: COMPLETED | OUTPATIENT
Start: 2024-07-17 | End: 2024-07-17

## 2024-07-17 RX ORDER — ENOXAPARIN SODIUM 100 MG/ML
40 INJECTION SUBCUTANEOUS EVERY 12 HOURS
Refills: 0 | Status: DISCONTINUED | OUTPATIENT
Start: 2024-07-17 | End: 2024-07-17

## 2024-07-17 RX ORDER — INSULIN LISPRO 100 [IU]/ML
INJECTION, SOLUTION SUBCUTANEOUS
Refills: 0 | Status: DISCONTINUED | OUTPATIENT
Start: 2024-07-17 | End: 2024-07-17

## 2024-07-17 RX ORDER — SEMAGLUTIDE 1.34 MG/ML
2 INJECTION, SOLUTION SUBCUTANEOUS
Refills: 0 | DISCHARGE

## 2024-07-17 RX ORDER — ALBUTEROL 90 MCG
2 AEROSOL REFILL (GRAM) INHALATION
Qty: 1 | Refills: 11
Start: 2024-07-17 | End: 2025-07-11

## 2024-07-17 RX ORDER — OMEPRAZOLE 10 MG/1
1 CAPSULE, DELAYED RELEASE ORAL
Refills: 0 | DISCHARGE

## 2024-07-17 RX ORDER — INSULIN DEGLUDEC 100 U/ML
44 INJECTION, SOLUTION SUBCUTANEOUS
Refills: 0 | DISCHARGE

## 2024-07-17 RX ORDER — DEXTROSE 30 % IN WATER 30 %
15 VIAL (ML) INTRAVENOUS ONCE
Refills: 0 | Status: DISCONTINUED | OUTPATIENT
Start: 2024-07-17 | End: 2024-07-17

## 2024-07-17 RX ORDER — INSULIN GLARGINE 100 [IU]/ML
44 INJECTION, SOLUTION SUBCUTANEOUS AT BEDTIME
Refills: 0 | Status: DISCONTINUED | OUTPATIENT
Start: 2024-07-17 | End: 2024-07-17

## 2024-07-17 RX ORDER — PREDNISONE 10 MG/1
2 TABLET ORAL
Qty: 10 | Refills: 0
Start: 2024-07-17 | End: 2024-07-21

## 2024-07-17 RX ORDER — OMEPRAZOLE 10 MG/1
1 CAPSULE, DELAYED RELEASE ORAL
Qty: 30 | Refills: 0
Start: 2024-07-17 | End: 2024-08-15

## 2024-07-17 RX ORDER — INSULIN LISPRO 100 [IU]/ML
22 INJECTION, SOLUTION SUBCUTANEOUS
Refills: 0 | DISCHARGE

## 2024-07-17 RX ORDER — PREDNISONE 10 MG/1
40 TABLET ORAL EVERY 24 HOURS
Refills: 0 | Status: DISCONTINUED | OUTPATIENT
Start: 2024-07-17 | End: 2024-07-17

## 2024-07-17 RX ORDER — INSULIN LISPRO 100 [IU]/ML
22 INJECTION, SOLUTION SUBCUTANEOUS
Refills: 0 | Status: DISCONTINUED | OUTPATIENT
Start: 2024-07-17 | End: 2024-07-17

## 2024-07-17 RX ORDER — BUDESONIDE/FORMOTEROL FUMARATE 160-4.5MCG
2 HFA AEROSOL WITH ADAPTER (GRAM) INHALATION
Refills: 0 | Status: DISCONTINUED | OUTPATIENT
Start: 2024-07-17 | End: 2024-07-17

## 2024-07-17 RX ORDER — BUSPIRONE HYDROCHLORIDE 10 MG/1
15 TABLET ORAL EVERY 12 HOURS
Refills: 0 | Status: DISCONTINUED | OUTPATIENT
Start: 2024-07-17 | End: 2024-07-17

## 2024-07-17 RX ORDER — DULOXETINE HYDROCHLORIDE 20 MG/1
60 CAPSULE, DELAYED RELEASE ORAL DAILY
Refills: 0 | Status: DISCONTINUED | OUTPATIENT
Start: 2024-07-17 | End: 2024-07-17

## 2024-07-17 RX ORDER — GLUCAGON HYDROCHLORIDE 1 MG/ML
1 INJECTION, POWDER, FOR SOLUTION INTRAMUSCULAR; INTRAVENOUS; SUBCUTANEOUS ONCE
Refills: 0 | Status: DISCONTINUED | OUTPATIENT
Start: 2024-07-17 | End: 2024-07-17

## 2024-07-17 RX ORDER — AMLODIPINE BESYLATE 2.5 MG/1
1 TABLET ORAL
Qty: 0 | Refills: 0 | DISCHARGE

## 2024-07-17 RX ORDER — DEXAMETHASONE 1 MG/1
6 TABLET ORAL ONCE
Refills: 0 | Status: COMPLETED | OUTPATIENT
Start: 2024-07-17 | End: 2024-07-17

## 2024-07-17 RX ADMIN — IPRATROPIUM BROMIDE AND ALBUTEROL SULFATE 3 MILLILITER(S): .5; 3 SOLUTION RESPIRATORY (INHALATION) at 03:17

## 2024-07-17 RX ADMIN — IPRATROPIUM BROMIDE AND ALBUTEROL SULFATE 3 MILLILITER(S): .5; 3 SOLUTION RESPIRATORY (INHALATION) at 12:45

## 2024-07-17 RX ADMIN — IPRATROPIUM BROMIDE AND ALBUTEROL SULFATE 3 MILLILITER(S): .5; 3 SOLUTION RESPIRATORY (INHALATION) at 17:48

## 2024-07-17 RX ADMIN — DEXAMETHASONE 6 MILLIGRAM(S): 1 TABLET ORAL at 01:53

## 2024-07-17 RX ADMIN — POTASSIUM CHLORIDE 40 MILLIEQUIVALENT(S): 600 TABLET, FILM COATED, EXTENDED RELEASE ORAL at 01:54

## 2024-07-17 RX ADMIN — INSULIN GLARGINE 44 UNIT(S): 100 INJECTION, SOLUTION SUBCUTANEOUS at 03:29

## 2024-07-17 RX ADMIN — INSULIN LISPRO 1: 100 INJECTION, SOLUTION SUBCUTANEOUS at 14:05

## 2024-07-17 RX ADMIN — DULOXETINE HYDROCHLORIDE 60 MILLIGRAM(S): 20 CAPSULE, DELAYED RELEASE ORAL at 12:45

## 2024-07-17 RX ADMIN — BUSPIRONE HYDROCHLORIDE 15 MILLIGRAM(S): 10 TABLET ORAL at 08:04

## 2024-07-17 RX ADMIN — INSULIN LISPRO 22 UNIT(S): 100 INJECTION, SOLUTION SUBCUTANEOUS at 10:16

## 2024-07-17 RX ADMIN — ENOXAPARIN SODIUM 40 MILLIGRAM(S): 100 INJECTION SUBCUTANEOUS at 08:05

## 2024-07-17 RX ADMIN — AMLODIPINE BESYLATE 10 MILLIGRAM(S): 2.5 TABLET ORAL at 08:04

## 2024-07-17 RX ADMIN — AZITHROMYCIN 255 MILLIGRAM(S): 250 TABLET, FILM COATED ORAL at 01:53

## 2024-07-17 RX ADMIN — INSULIN LISPRO 1: 100 INJECTION, SOLUTION SUBCUTANEOUS at 10:15

## 2024-07-17 RX ADMIN — INSULIN LISPRO 22 UNIT(S): 100 INJECTION, SOLUTION SUBCUTANEOUS at 14:05

## 2024-07-17 RX ADMIN — Medication 2 PUFF(S): at 17:49

## 2024-07-17 RX ADMIN — BUDESONIDE 0.5 MILLIGRAM(S): 0.25 INHALANT ORAL at 03:18

## 2024-07-17 NOTE — DISCHARGE NOTE PROVIDER - NSDCCPTREATMENT_GEN_ALL_CORE_FT
PRINCIPAL PROCEDURE  Procedure: Transthoracic echocardiography (TTE)  Findings and Treatment: There is mild concentric left ventricular hypertrophy. The left ventricle   is normal in size and systolic function with a calculated ejection   fraction of 60-65%.   1. Normal left ventricular size and systolic function.   2. Mild symmetric left ventricular hypertrophy.   3. Normal right ventricular size and systolic function.   4. Normal atria.   5. Moderate aortic stenosis. The peak transvalvular velocity is 3.31   m/s, the mean transvalvular gradient is 26.00 mmHg, and the LVOT/AV  velocity ratio is 0.41 The aortic valve area (estimated via the   continuity method) is 1.15 cm².   6. Mild-to-moderate aortic regurgitation.   7. No pericardial effusion.   8. No prior echo is available for comparison.

## 2024-07-17 NOTE — DISCHARGE NOTE PROVIDER - NSFOLLOWUPCLINICS_GEN_ALL_ED_FT
[facility]  Family Medicine  [address]  [place], [place] [address]  Phone: [phone]  Fax:   Follow Up Time: 2 weeks     [facility]  Family Medicine  [address] [place] [address]  Phone: [phone]  Fax:   Follow Up Time: 2 weeks    Cardiology at [facility]  Cardiology  [address] [place] [address]  Phone: [phone]  Fax:   Follow Up Time: 2 weeks

## 2024-07-17 NOTE — DISCHARGE NOTE NURSING/CASE MANAGEMENT/SOCIAL WORK - PATIENT PORTAL LINK FT
You can access the FollowMyHealth Patient Portal offered by [facility] by registering at the following website: [email or web address]. By joining [facility]’s FollowMyHealth portal, you will also be able to view your health information using other applications (apps) compatible with our system.

## 2024-07-17 NOTE — H&P ADULT - PROBLEM SELECTOR PLAN 7
F: PO  E: replete  N: Dash CC  DVT ppx: lovenox 40 BID  Dispo: Home    FULL CODE
Soft and Bite Sized

## 2024-07-17 NOTE — ED PROVIDER NOTE - OBJECTIVE STATEMENT
63-year-old female with insulin-dependent diabetes mellitus hypertension GERD obesity chronic back pain COPD asthma everyday smoker smokes 10 cigarettes today went to sleep woke up feeling short of breath chest tightness which was left-sided called EMS was found to be hypoxic to 78% arrived on CPAP and had DuoNebs given.  Feeling improved on my evaluation.  Denies fever chills admits to mild cough.  Currently feeling that her short of breath and chest tightness is improved.  No urinary complaints.  No abdominal pain vomiting.

## 2024-07-17 NOTE — ED PROVIDER NOTE - CLINICAL SUMMARY MEDICAL DECISION MAKING FREE TEXT BOX
63-year-old female with bronchitis COPD exacerbation has no known diagnosis of COPD but is an every day smoker will evaluate and treat for bronchitis COPD rule out pneumonia screening cardiac enzymes and BNP   EKG normal sinus rhythm 81 right bundle branch block leftward axis Q waves V4 through V6?   plan for CBC CMP troponin proBNP DuoNebs Pulmicort dexamethasone RVP chest x-ray

## 2024-07-17 NOTE — H&P ADULT - PROBLEM SELECTOR PLAN 1
#Acute hypoxic respiratory failure  Pt with shortness of breath and wheezing concerning for acute on chronic COPD exacerbation. Takes Breztri at home and follows with pulmonologist outpatient. Lungs now clear and stably in RA s/p decadron and duonebs in ED. Trigger is unclear. Ramona is daily smoker, Still pending RVP Currently not septic.    - Continue duonebs Q6  - Prednisone 40 mg x5 days  - Protonix 40 QD while on steroid   - Would forgo azithro due to prolonged QTc of 515  - f/u RVP

## 2024-07-17 NOTE — DISCHARGE NOTE NURSING/CASE MANAGEMENT/SOCIAL WORK - NSDCPEEMAIL_GEN_ALL_CORE
[facility] email [email or web address]

## 2024-07-17 NOTE — DISCHARGE NOTE PROVIDER - NSDCMRMEDTOKEN_GEN_ALL_CORE_FT
Admelog SoloStar 100 units/mL injectable solution: 22 unit(s) injectable 3 times a day (before meals)  amLODIPine 10 mg oral tablet: 1 tab(s) orally  Breztri Aerosphere 160 mcg-9 mcg-4.8 mcg/inh inhalation aerosol: 2 puff(s) inhaled once a day  busPIRone 15 mg oral tablet: 1 tab(s) orally once a day  DULoxetine 60 mg oral delayed release capsule: 1 cap(s) orally once a day  Farxiga 10 mg oral tablet: 1 tab(s) orally  gabapentin 800 mg oral tablet: 1 tab(s) orally once a day (at bedtime)  insulin degludec 100 units/mL subcutaneous solution: 44 subcutaneous once a day (at bedtime)  omeprazole 20 mg oral delayed release tablet: 1 tab(s) orally once a day  Ozempic 8 mg/3 mL (2 mg dose) subcutaneous solution: 2 milligram(s) subcutaneously once a week   Admelog SoloStar 100 units/mL injectable solution: 22 unit(s) injectable 3 times a day (before meals)  Albuterol (Eqv-ProAir HFA) 90 mcg/inh inhalation aerosol: 2 puff(s) inhaled 2 times a day  amLODIPine 10 mg oral tablet: 1 tab(s) orally once a day  Breztri Aerosphere 160 mcg-9 mcg-4.8 mcg/inh inhalation aerosol: 2 puff(s) inhaled once a day  busPIRone 15 mg oral tablet: 1 tab(s) orally once a day  DULoxetine 60 mg oral delayed release capsule: 1 cap(s) orally once a day  Farxiga 10 mg oral tablet: 1 tab(s) orally  gabapentin 800 mg oral tablet: 1 tab(s) orally once a day (at bedtime)  insulin degludec 100 units/mL subcutaneous solution: 44 subcutaneous once a day (at bedtime)  omeprazole 20 mg oral delayed release tablet: 1 tab(s) orally once a day  Ozempic 8 mg/3 mL (2 mg dose) subcutaneous solution: 2 milligram(s) subcutaneously once a week  predniSONE 20 mg oral tablet: 2 tab(s) orally every 24 hours

## 2024-07-17 NOTE — H&P ADULT - HISTORY OF PRESENT ILLNESS
63Factive smoker 1/2 ppd COPD IDDM HTN GERD chronic back pain 2/2 lumbar radiculopathy. Had acute onset shortness of breath while walking to the kitchen to make herself a meal. HGer roommate noticed hershortness of breath and became concerned when she began gasping for air. HEr roommate called EMS. Per ED documentation pt's O2 sat was 78% in the field. She arrived to the ED on CAPAP but was able to be transitioned to room air. She has ha [facility] diagnosis of COPD for many uyears and takes Breztri outpt. She has never been admitted to the ICU or been intubated in the past. She denies fever chills admits to mild cough.  Currently feeling that her short of breath and chest tightness is improved.  No urinary complaints.  No abdominal pain vomiting.    In the ED:  Initial vital signs: T: 98.4 F, HR: 93, BP: 133/67, R: 20, SpO2: 98% on nonrebrether > 98% 4LNC > 98% RA  ED course:   Labs: significant for  Imaging:  CXR: No consolidations  EKG: L axis deviation  wave I, II aVL. RBBB  Medications:  63Factive smoker 1/2 ppd COPD IDDM HTN GERD chronic back pain 2/2 lumbar radiculopathy. Had acute onset shortness of breath while walking to the kitchen to make herself a meal. HGer roommate noticed hershortness of breath and became concerned when she began gasping for air. HEr roommate called EMS. Per ED documentation pt's O2 sat was 78% in the field. She arrived to the ED on CAPAP but was able to be transitioned to room air. She has ha [facility] diagnosis of COPD for many uyears and takes Breztri outpt. She has never been admitted to the ICU or been intubated in the past. She denies fever chills admits to mild cough.  Currently feeling that her short of breath and chest tightness is improved.  No urinary complaints.  No abdominal pain vomiting.    In the ED:  Initial vital signs: T: 98.4 F, HR: 93, BP: 133/67, R: 20, SpO2: 98% on nonrebrether > 98% 4LNC > 98% RA  ED course:   Labs: significant for  Imaging:  CXR: No consolidations  EKG: L axis deviation  Q waves lateral leads. RBBB, QTc 515  Medications: Duonebs, azithromycin, decradon 6

## 2024-07-17 NOTE — H&P ADULT - ATTENDING COMMENTS
numerical 0-10 63-year-old female with a PMHx of COPD, tobacco abuse, IDDM, HTN, GERD and chronic lower back pain who presented with acute hypoxic respiratory failure 2/2 COPD exacerbation.      Plan:    -continue with Symbicort, Spiriva and Duonebs, discharge with home Breztri and PRN Albuterol   -continue with Prednisone 40mg daily to complete 5-day course   -hold off on further Azithromycin given prolonged QTc   -outpatient follow up for aortic stenosis   -outpatient pulmonology follow up      Rest of plan as per resident note. 63-year-old female with a PMHx of COPD, tobacco abuse, IDDM, HTN, GERD and chronic lower back pain who presented with acute hypoxic respiratory failure 2/2 COPD exacerbation.      Plan:    -continue with Symbicort, Spiriva and Duonebs, discharge with home Breztri (or triple therapy TIC) and PRN Albuterol   -continue with Prednisone 40mg daily to complete 5-day course   -hold off on further Azithromycin given prolonged QTc   -outpatient follow up for aortic stenosis   -outpatient pulmonology follow up      Rest of plan as per resident note.

## 2024-07-17 NOTE — H&P ADULT - PROBLEM SELECTOR PLAN 5
Patient with chronic back pain 2/2 lumbar radiculopathy. On Gabapentin 800 QHS and DUloxetine 60 mg QD. Currenlty no pain    -Continue home meds

## 2024-07-17 NOTE — DISCHARGE NOTE PROVIDER - HOSPITAL COURSE
63Factive smoker 1/2 ppd COPD IDDM HTN GERD chronic back pain 2/2 lumbar radiculopathy presents with wheezing and shortness of breath concerning for acute on chronic COPD exacerbation.    #COPD exacerbation        #Acute hypoxic respiratory failure  Pt with shortness of breath and wheezing concerning for acute on chronic COPD exacerbation. Takes Breztri at home and follows with pulmonologist outpatient. Lungs now clear and stably in RA s/p decadron and duonebs in ED. Trigger is unclear. Patient is daily smoker, Still pending RVP Currently not septic. RVP negative.  - Prednisone 40 mg x5 days  - Protonix 40 QD while on steroid   - No azithro due to prolonged QTc of 515  - discharged on triple therapy inhaler and albuterol rescue inhaler with pulm outpatient follow up tomorrow    #Type 2 diabetes mellitus  Patient with IDDM on Tresiba 44 QHS and Admelog 22 TID, Farxiga 10, Ozempic 2 mg weekly. Favor keeping home insulin regimen without reduction, as pt receiving steroids.  -Continue home meds Tresiba 44 qhs and lispro 22 TID, A1c 6.4%, outpatient PCP follow up    #Holosystolic cardiac murmur    #Moderate Aortic Stenosis     #HFpEF   Pt with cardiac murmur on exam. EKG with RBBB left axis deviation, and Lateral Q waves. Pt endorses no cardiac history, although she says she did see a cardiologist outpatient one time and did not need to follow up. Currently no cardiac symptoms  -Ensure cardiologist follow-up at discharge  -TTE showed: There is mild concentric left ventricular hypertrophy. The left ventricle is normal in size and systolic function with a calculated ejection fraction of 60-65%.   1. Normal left ventricular size and systolic function.   2. Mild symmetric left ventricular hypertrophy.   3. Normal right ventricular size and systolic function.   4. Normal atria.   5. Moderate aortic stenosis. The peak transvalvular velocity is 3.31   m/s, the mean transvalvular gradient is 26.00 mmHg, and the LVOT/AV  velocity ratio is 0.41 The aortic valve area (estimated via the   continuity method) is 1.15 cm².   6. Mild-to-moderate aortic regurgitation.   7. No pericardial effusion.   8. No prior echo is available for comparison.    #Hypertension  Patient  with hypertension on amlodipine 10.  -Continue home med.    #Lumbar radiculopathy.   Patient with chronic back pain 2/2 lumbar radiculopathy. On Gabapentin 800 QHS and Duloxetine 60 mg QD. Currently no pain  -Continue home meds.    #Anxiety and depression.   Patient with anxiety and depression. Takes Buspirone 15 BID, (Also takes duloxetine for chronic pain.)  -Continue home med.    Patient was discharged to: home  New medications: Breztri, albuterol rescue inhaler, prednisone 5 days  Changes to old medications: n/a  Medications that were stopped: n/a  Items to follow up as outpatient: PCP, Pulmonary,     Physical exam at the time of discharge:  General: NAD; speaking in full sentences  HEENT: NC/AT; PERRL; EOMI; MMM  Neck: supple; no JVD  Cardiac: RRR; +S1/S2, 3/6 holosystolic murmur  Pulm: CTA B/L; no W/R/R  GI: soft, NT/ND, +BS  Extremities: WWP; no edema, clubbing or cyanosis  Vasc: 2+ radial, DP pulses B/L  Neuro: AAOx3; no focal deficits

## 2024-07-17 NOTE — H&P ADULT - PROBLEM SELECTOR PLAN 6
Patient with anxiety and depression. Takes Buspirone 15 BID, (Also takes duloxetine for chronic pain.)  -Continue home med

## 2024-07-17 NOTE — H&P ADULT - PROBLEM SELECTOR PLAN 3
Pt with cardiac murmur on exam. EKG with RBBB left axid deviation, and Lateral Q waves. Pt endorses no cardiac history, although she says she did see a cardiologist outpatient one time and did not need to follow up. Currently no cardiac symptoms    - Obtain collateral from outpt providers  - F/u TTE  - Ensure cardiologist follow-up at discharge  - F/u Lipid panel

## 2024-07-17 NOTE — H&P ADULT - NSHPPHYSICALEXAM_GEN_ALL_CORE
VITAL SIGNS:  Vital Signs Last 24 Hrs  T(C): 36.7 (17 Jul 2024 05:19), Max: 36.9 (16 Jul 2024 22:07)  T(F): 98.1 (17 Jul 2024 05:19), Max: 98.4 (16 Jul 2024 22:07)  HR: 98 (17 Jul 2024 05:19) (88 - 98)  BP: 145/96 (17 Jul 2024 05:19) (110/75 - 145/96)  BP(mean): --  RR: 18 (17 Jul 2024 05:19) (17 - 22)  SpO2: 97% (17 Jul 2024 05:19) (92% - 98%)    Parameters below as of 17 Jul 2024 05:19  Patient On (Oxygen Delivery Method): room air      PHYSICAL EXAM:  General: NAD; speaking in full sentences  HEENT: NC/AT; PERRL; EOMI; MMM  Neck: supple; no JVD  Cardiac: RRR; +S1/S2, 3/6 holosytolic murmur  Pulm: CTA B/L; no W/R/R  GI: soft, NT/ND, +BS  Extremities: WWP; no edema, clubbing or cyanosis  Vasc: 2+ radial, DP pulses B/L  Neuro: AAOx3; no focal deficits

## 2024-07-17 NOTE — DISCHARGE NOTE PROVIDER - ATTENDING DISCHARGE PHYSICAL EXAMINATION:
Gen: NAD, sitting comfortably in bed  HEENT: EOMI, PERRL, no scleral icterus  CV: normal S1 and S2  Lungs: CTABL, normal respiratory effort on RA  Ab: soft, NT, ND, normal BS  Ext: no LE edema   Neuro: A&O x 3, no focal deficits

## 2024-07-17 NOTE — H&P ADULT - ASSESSMENT
63Factive smoker 1/1 ppd COPD IDDM HTN GERD chronic back pain 2/2 lumbar radiculopathy presents with wheezing and shortness of breath concernign for acute on cronic COPD exacerbation.

## 2024-07-17 NOTE — H&P ADULT - TIME BILLING
Review of hospital course, labs, vitals and medical records  Bedside exam and patient interview   Discussion of plan with housestaff and consultants   Documenting the encounter

## 2024-07-17 NOTE — H&P ADULT - PROBLEM SELECTOR PLAN 2
Patient with IDDM on TResiba 44 QHS and Admelog 22 TID, Farxiga 10, Ozempic 2 mg weekly. Favor keeping kate e insuliegimen without reduction, as pt receivign steroids.    - Lantus 44 QHS  - Lispro 22 TID premeal  - ISS  - hold non-insulin meds  -F/u A1C

## 2024-07-17 NOTE — DISCHARGE NOTE PROVIDER - NSDCCPCAREPLAN_GEN_ALL_CORE_FT
PRINCIPAL DISCHARGE DIAGNOSIS  Diagnosis: COPD exacerbation  Assessment and Plan of Treatment: COPD is a lung disease that makes it hard to breathe. In people with COPD, the airways (the branching tubes that carry air within the lungs) become narrow and damaged. This makes people feel out of breath and tired. COPD can be a serious illness. It cannot be cured and it usually gets worse over time. But there are treatments that can help. The most common cause of COPD is smoking. Smoke can damage the lungs forever and cause COPD. The most important thing you can do for your COPD is to stop smoking. A COPD exacerbation is when you have worsening of your symptoms such as shortness of breath, cough, worsening mucus production.There are a lot of medicines to treat COPD. Most people use inhalers that help open up their airways or decrease swelling in the airways and might take a steroid medicine in a pill for a flare of COPD.         SECONDARY DISCHARGE DIAGNOSES  Diagnosis: Cardiac murmur  Assessment and Plan of Treatment:      PRINCIPAL DISCHARGE DIAGNOSIS  Diagnosis: COPD exacerbation  Assessment and Plan of Treatment: COPD is a lung disease that makes it hard to breathe. In people with COPD, the airways (the branching tubes that carry air within the lungs) become narrow and damaged. This makes people feel out of breath and tired. COPD can be a serious illness. It cannot be cured and it usually gets worse over time. But there are treatments that can help. The most common cause of COPD is smoking. Smoke can damage the lungs forever and cause COPD. The most important thing you can do for your COPD is to stop smoking. A COPD exacerbation is when you have worsening of your symptoms such as shortness of breath, cough, worsening mucus production.There are a lot of medicines to treat COPD. Most people use inhalers that help open up their airways or decrease swelling in the airways and might take a steroid medicine in a pill for a flare of COPD.   PLEASE TAKE THE FOLLOWING:        SECONDARY DISCHARGE DIAGNOSES  Diagnosis: Cardiac murmur  Assessment and Plan of Treatment:      PRINCIPAL DISCHARGE DIAGNOSIS  Diagnosis: COPD exacerbation  Assessment and Plan of Treatment: COPD is a lung disease that makes it hard to breathe. In people with COPD, the airways (the branching tubes that carry air within the lungs) become narrow and damaged. This makes people feel out of breath and tired. COPD can be a serious illness. It cannot be cured and it usually gets worse over time. But there are treatments that can help. The most common cause of COPD is smoking. Smoke can damage the lungs forever and cause COPD. The most important thing you can do for your COPD is to stop smoking. A COPD exacerbation is when you have worsening of your symptoms such as shortness of breath, cough, worsening mucus production.There are a lot of medicines to treat COPD. Most people use inhalers that help open up their airways or decrease swelling in the airways and might take a steroid medicine in a pill for a flare of COPD.   PLEASE TAKE THE FOLLOWING MEDICATIONS: Please take your inhaler as directed DAILY and follow up outpatient with your pulmonary specialist. Please take the 5 day course of prednisone 40 milligrams daily over the next five days.        SECONDARY DISCHARGE DIAGNOSES  Diagnosis: Cardiac murmur  Assessment and Plan of Treatment: When your heart beats, valves open and close, allowing blood to flow. When the valves close, they produce two sounds: a “lub” and a “dub.”  If your heart makes a whooshing or swishing sound instead, that’s called a heart murmur. A murmur means blood is flowing abnormally across your heart valves. A murmur may mean there’s a problem with your heart. But heart murmurs are also present in healthy people who don’t have a heart problem (called “innocent” heart murmurs).  PLEASE FOLLOW UP WITH CARDIOLOGY

## 2024-07-17 NOTE — DISCHARGE NOTE NURSING/CASE MANAGEMENT/SOCIAL WORK - NSDCPEWEB_GEN_ALL_CORE
[facility] for Tobacco Control website --- [email or web address] website --- [email or web address]

## 2024-07-17 NOTE — H&P ADULT - NSHPLABSRESULTS_GEN_ALL_CORE
LABS:                          14.0   12.51 )-----------( 254      ( 16 Jul 2024 22:31 )             42.6     07-16    142  |  102  |  8   ----------------------------<  76  3.2<L>   |  33<H>  |  0.74    Ca    9.1      16 Jul 2024 22:31          Urinalysis Basic - ( 16 Jul 2024 22:31 )    Color: x / Appearance: x / SG: x / pH: x  Gluc: 76 mg/dL / Ketone: x  / Bili: x / Urobili: x   Blood: x / Protein: x / Nitrite: x   Leuk Esterase: x / RBC: x / WBC x   Sq Epi: x / Non Sq Epi: x / Bacteria: x

## 2024-07-17 NOTE — ED PROVIDER NOTE - PROGRESS NOTE DETAILS
Troponin proBNP negative glucose within normal limits anion gap negative potassium low will replenish chest x-ray without pneumothorax does have scattered right-sided hazy opacity with flattening of diaphragms will treat for atypical pneumonia and COPD exacerbation admitted to medicine stabilized feeling improved no longer requiring supplemental O2

## 2024-07-17 NOTE — PATIENT PROFILE ADULT - NSPROPOAURINARYCATHETER_GEN_A_NUR
[facility], call [phone].       no

## 2024-07-25 DIAGNOSIS — I50.32 CHRONIC DIASTOLIC (CONGESTIVE) HEART FAILURE: ICD-10-CM

## 2024-07-25 DIAGNOSIS — I44.4 LEFT ANTERIOR FASCICULAR BLOCK: ICD-10-CM

## 2024-07-25 DIAGNOSIS — M54.16 RADICULOPATHY, LUMBAR REGION: ICD-10-CM

## 2024-07-25 DIAGNOSIS — E66.9 OBESITY, UNSPECIFIED: ICD-10-CM

## 2024-07-25 DIAGNOSIS — I35.1 NONRHEUMATIC AORTIC (VALVE) INSUFFICIENCY: ICD-10-CM

## 2024-07-25 DIAGNOSIS — R01.1 CARDIAC MURMUR, UNSPECIFIED: ICD-10-CM

## 2024-07-25 DIAGNOSIS — J45.901 UNSPECIFIED ASTHMA WITH (ACUTE) EXACERBATION: ICD-10-CM

## 2024-07-25 DIAGNOSIS — F17.210 NICOTINE DEPENDENCE, CIGARETTES, UNCOMPLICATED: ICD-10-CM

## 2024-07-25 DIAGNOSIS — J44.0 CHRONIC OBSTRUCTIVE PULMONARY DISEASE WITH (ACUTE) LOWER RESPIRATORY INFECTION: ICD-10-CM

## 2024-07-25 DIAGNOSIS — Z79.4 LONG TERM (CURRENT) USE OF INSULIN: ICD-10-CM

## 2024-07-25 DIAGNOSIS — I45.10 UNSPECIFIED RIGHT BUNDLE-BRANCH BLOCK: ICD-10-CM

## 2024-07-25 DIAGNOSIS — I51.7 CARDIOMEGALY: ICD-10-CM

## 2024-07-25 DIAGNOSIS — J96.21 ACUTE AND CHRONIC RESPIRATORY FAILURE WITH HYPOXIA: ICD-10-CM

## 2024-07-25 DIAGNOSIS — E11.9 TYPE 2 DIABETES MELLITUS WITHOUT COMPLICATIONS: ICD-10-CM

## 2024-07-25 DIAGNOSIS — I11.0 HYPERTENSIVE HEART DISEASE WITH HEART FAILURE: ICD-10-CM

## 2024-07-25 DIAGNOSIS — J45.909 UNSPECIFIED ASTHMA, UNCOMPLICATED: ICD-10-CM

## 2024-07-25 DIAGNOSIS — F41.8 OTHER SPECIFIED ANXIETY DISORDERS: ICD-10-CM

## 2024-07-25 DIAGNOSIS — J44.1 CHRONIC OBSTRUCTIVE PULMONARY DISEASE WITH (ACUTE) EXACERBATION: ICD-10-CM

## 2024-07-25 DIAGNOSIS — K21.9 GASTRO-ESOPHAGEAL REFLUX DISEASE WITHOUT ESOPHAGITIS: ICD-10-CM

## 2024-07-25 DIAGNOSIS — R06.02 SHORTNESS OF BREATH: ICD-10-CM

## 2024-07-25 DIAGNOSIS — J40 BRONCHITIS, NOT SPECIFIED AS ACUTE OR CHRONIC: ICD-10-CM

## 2024-07-25 DIAGNOSIS — I35.0 NONRHEUMATIC AORTIC (VALVE) STENOSIS: ICD-10-CM
